# Patient Record
Sex: MALE | Race: BLACK OR AFRICAN AMERICAN | Employment: FULL TIME | URBAN - METROPOLITAN AREA
[De-identification: names, ages, dates, MRNs, and addresses within clinical notes are randomized per-mention and may not be internally consistent; named-entity substitution may affect disease eponyms.]

---

## 2023-02-22 ENCOUNTER — APPOINTMENT (OUTPATIENT)
Dept: CT IMAGING | Age: 48
End: 2023-02-22
Payer: COMMERCIAL

## 2023-02-22 ENCOUNTER — HOSPITAL ENCOUNTER (EMERGENCY)
Age: 48
Discharge: HOME | End: 2023-02-22
Payer: COMMERCIAL

## 2023-02-22 VITALS
RESPIRATION RATE: 20 BRPM | OXYGEN SATURATION: 100 % | DIASTOLIC BLOOD PRESSURE: 88 MMHG | HEART RATE: 78 BPM | SYSTOLIC BLOOD PRESSURE: 142 MMHG | TEMPERATURE: 98.2 F

## 2023-02-22 DIAGNOSIS — S09.90XA INJURY OF HEAD, INITIAL ENCOUNTER: Primary | ICD-10-CM

## 2023-02-22 DIAGNOSIS — J34.89 NASAL SORE: ICD-10-CM

## 2023-02-22 PROCEDURE — 99284 EMERGENCY DEPT VISIT MOD MDM: CPT

## 2023-02-22 PROCEDURE — 70450 CT HEAD/BRAIN W/O DYE: CPT

## 2023-02-22 RX ORDER — ECHINACEA PURPUREA EXTRACT 125 MG
1 TABLET ORAL PRN
Qty: 1 EACH | Refills: 3 | Status: SHIPPED | OUTPATIENT
Start: 2023-02-22

## 2023-02-22 NOTE — ED PROVIDER NOTES
EMERGENCY DEPARTMENT ENCOUNTER      PCP: No primary care provider on file. CHIEF COMPLAINT    Chief Complaint   Patient presents with    Head Injury     Injury jan 2010        HPI    Juan Rangel is a 41 y.o. male with no significant medical history who presents with complaints of pressure on the right side of his head and sore/pimple in his right nares with intermittent bleeding. He states he had a head injury in 2010 where a brick fell on his head. He states he had to have sutures for laceration at that time. Since then, he has had pressure to the area. He denies any pain. He states he would like to have this checked out. He is complaining of pain in his right nares that he rates as mild, tender, and intermittent. He states he occasionally feels a sore or pimple in his right nares. He states sometimes when he blows his nose he has blood from the nares. Nothing has alleviated or exacerbated his symptoms. He denies any fever, recent trauma or complaints at this time. REVIEW OF SYSTEMS    Constitutional:  Denies fever, chills, weight loss or weakness   HENT:  See HPI  Cardiovascular:  Denies chest pain, palpitations   Respiratory:  Denies cough or shortness of breath    GI:  Denies abdominal pain, nausea, vomiting, or diarrhea  :  Denies any urinary symptoms  Musculoskeletal:  Denies back pain  Skin:  Denies rash  Neurologic:  Denies headache, focal weakness. Does complain of   Endocrine:  Denies polyuria or polydypsia   Lymphatic:  Denies swollen glands     All other review of systems are negative  See HPI and nursing notes for additional information     PAST MEDICAL AND SURGICAL HISTORY    History reviewed. No pertinent past medical history.   Past Surgical History:   Procedure Laterality Date    HERNIA REPAIR         CURRENT MEDICATIONS        ALLERGIES    No Known Allergies    SOCIAL AND FAMILY HISTORY    Social History     Socioeconomic History    Marital status:      Spouse name: None    Number of children: None    Years of education: None    Highest education level: None   Tobacco Use    Smoking status: Never    Smokeless tobacco: Never   Substance and Sexual Activity    Alcohol use: Not Currently    Drug use: Never     History reviewed. No pertinent family history. PHYSICAL EXAM    VITAL SIGNS: BP (!) 142/88   Pulse 78   Temp 98.2 °F (36.8 °C) (Oral)   Resp 20   SpO2 100%    Constitutional:  Well developed, Well nourished. No distress  HENT:  Normocephalic, Atraumatic, PERRL. EOMI. Sclera clear. Conjunctiva normal, No discharge. No sores visualized in nares on exam. No bleeding. Neck/Lymphatics: supple, no JVD, no swollen nodes  Cardiovascular:   RRR,    No JVD  Respiratory:  Nonlabored breathing. Normal breath sounds, No wheezing  Abdomen: Bowel sounds normal, Soft, No tenderness, no masses. Musculoskeletal:    Distal cap refill and pulses intact bilateral upper and lower extremities  Bilateral upper and lower extremity ROM intact without pain or obvious deficit  Integument:  Warm, Dry  Neurologic: Alert & oriented , No focal deficits noted. Cranial nerves II through XII grossly intact. Normal gross motor coordination & motor strength bilateral upper and lower extremities  Sensation intact. Psychiatric:  Affect normal, Mood normal.       RADIOLOGY      Non-plain film images such as CT, Ultrasound and MRI are read by the radiologist. ANJELICA Ly CNP have directly visualized the radiologic plain film image(s) with the below findings read by radiologist and agree with interpretation:  CT HEAD WO CONTRAST   Final Result   No acute intracranial abnormality. CC/HPI Summary, DDx, ED Course, and Reassessment:   41 y.o. male with no significant medical history who presents with complaints of pressure on the right side of his head and sore/pimple in his right nares with intermittent bleeding. CT head without contrast obtained.     History from : Patient    Limitations to history : Language [facility] video  utilized for visit    Patient was given the following medications:  Medications - No data to display      Chronic conditions affecting care: head injury 2010    Discussion with Other Profesionals : None    Social Determinants : None available    Records Reviewed : None    Disposition Considerations (tests considered but not done, Shared Decision Making, Pt Expectation of Test or Tx.):   Appropriate for outpatient management CT of the head without contrast shows no acute findings. The patient's symptoms have been present since 2010, I did not feel any further work-up was necessary in the emergency department today. I did not visualize any sore in his nose at this time. He was prescribed saline nasal spray and instructed to use daily as needed. He was instructed to follow-up with a primary care provider this week regarding his head pressure and nasal sores. He was instructed to call today to schedule an appointment. He was instructed to return to the emergency department with worsening symptoms. He verbalized understanding and agrees to plan of care. I am the Primary Clinician of Record. Patient agrees to return emergency department if symptoms worsen or any new symptoms develop. Vital signs and nursing notes reviewed during ED course. I have low suspicion for intracranial hemorrhage, intracranial mass, nasal fracture, septal hematoma, hemorrhage, or other emergent condition. Clinical  IMPRESSION    1. Injury of head, initial encounter    2. Nasal sore              Comment: Please note this report has been produced using speech recognition software and may contain errors related to that system including errors in grammar, punctuation, and spelling, as well as words and phrases that may be inappropriate. If there are any questions or concerns please feel free to contact the dictating provider for clarification. Gabino Course, APRN - CNP  02/22/23 2273

## 2023-02-22 NOTE — DISCHARGE INSTRUCTIONS
Follow up primary care provider within 1 to 2 weeks. Call today to schedule an appointment. Have them recheck your blood pressure, it was elevated during today's stay. Use nasal saline spray as directed. Return to the emergency department with worsening symptoms.

## 2023-02-22 NOTE — ED TRIAGE NOTES
Pt presents to ED for a head injury that happened in 2010, states he feels like his head has been \"heavy\" since.

## 2023-02-22 NOTE — Clinical Note
Lynn Rojas was seen and treated in our emergency department on 2/22/2023. He may return to work on 02/23/2023. If you have any questions or concerns, please don't hesitate to call.       ANJELICA Cornejo - CNP

## 2023-12-08 ENCOUNTER — HOSPITAL ENCOUNTER (EMERGENCY)
Age: 48
Discharge: HOME | End: 2023-12-08
Attending: STUDENT IN AN ORGANIZED HEALTH CARE EDUCATION/TRAINING PROGRAM
Payer: COMMERCIAL

## 2023-12-08 VITALS
DIASTOLIC BLOOD PRESSURE: 87 MMHG | SYSTOLIC BLOOD PRESSURE: 118 MMHG | HEART RATE: 72 BPM | OXYGEN SATURATION: 99 % | WEIGHT: 182.4 LBS | RESPIRATION RATE: 19 BRPM | HEIGHT: 72 IN | TEMPERATURE: 97.9 F | BODY MASS INDEX: 24.71 KG/M2

## 2023-12-08 DIAGNOSIS — R51.9 NONINTRACTABLE HEADACHE, UNSPECIFIED CHRONICITY PATTERN, UNSPECIFIED HEADACHE TYPE: Primary | ICD-10-CM

## 2023-12-08 LAB
EKG ATRIAL RATE: 72 BPM
EKG DIAGNOSIS: NORMAL
EKG P AXIS: 69 DEGREES
EKG P-R INTERVAL: 168 MS
EKG Q-T INTERVAL: 366 MS
EKG QRS DURATION: 104 MS
EKG QTC CALCULATION (BAZETT): 400 MS
EKG R AXIS: -12 DEGREES
EKG T AXIS: 30 DEGREES
EKG VENTRICULAR RATE: 72 BPM

## 2023-12-08 PROCEDURE — 99283 EMERGENCY DEPT VISIT LOW MDM: CPT

## 2023-12-08 PROCEDURE — 93005 ELECTROCARDIOGRAM TRACING: CPT | Performed by: STUDENT IN AN ORGANIZED HEALTH CARE EDUCATION/TRAINING PROGRAM

## 2023-12-08 ASSESSMENT — ENCOUNTER SYMPTOMS
ABDOMINAL PAIN: 0
SHORTNESS OF BREATH: 0
SORE THROAT: 0
COUGH: 0
VOMITING: 0
NAUSEA: 0

## 2023-12-08 ASSESSMENT — PAIN SCALES - GENERAL: PAINLEVEL_OUTOF10: 3

## 2023-12-08 ASSESSMENT — PAIN DESCRIPTION - DESCRIPTORS: DESCRIPTORS: PRESSURE

## 2023-12-08 ASSESSMENT — PAIN DESCRIPTION - LOCATION: LOCATION: HEAD;NECK

## 2023-12-08 NOTE — ED PROVIDER NOTES
[address] [facility]  Emergency Department Encounter    Pt Name:Gian Arnold  MRN: [id number]  [address] 1975  Date of evaluation: 12/8/23  PCP:  No primary care provider on file. CHIEF COMPLAINT       Chief Complaint   Patient presents with    Hypertension     Patient reports for the past 2 weeks he has not been feeling well and his blood pressure has been elevated. HISTORY OF PRESENT ILLNESS     Edgard Liang is a 57 y.o. male who presents with concern for elevated blood pressure. Patient is [place] speaking only. Patient states that he has been checking his blood pressure and his numbers have been 13 x 9, 14 x 8 which through the  seems that these are at 130/90 and 140/80. He states his blood pressures are usually lower. He states this morning he woke up with not a \"headache \"but had heaviness. He states that he commonly gets this after not being able to sleep. He states that he has a job requiring him to wake up early in the morning and goes to bed late at such as he did last night. States this head heaviness is no different than previous head heaviness. Reports it is mild. No numbness tingling weakness vision changes. He denies any chest pain or shortness of breath. No cough nasal congestion, sore throat. Patient also states he wants to be evaluated for his kidneys. States when he was [facility] he had an ultrasound that showed kidney stones and he is wondering if they are still there. He states he has no problem with urination or flank pain. PAST MEDICAL / SURGICAL / SOCIAL / FAMILY HISTORY      has no past medical history on file. has a past surgical history that includes hernia repair.     Social History     Socioeconomic History    Marital status:      Spouse name: Not on file    Number of children: Not on file    Years of education: Not on file    Highest education level: Not on file distress. Appearance: He is well-developed. He is not ill-appearing. HENT:      Head: Normocephalic and atraumatic. Eyes:      Pupils: Pupils are equal, round, and reactive to light. Cardiovascular:      Rate and Rhythm: Normal rate and regular rhythm. Pulmonary:      Effort: Pulmonary effort is normal.      Breath sounds: Normal breath sounds. Abdominal:      General: There is no distension. Palpations: Abdomen is soft. Tenderness: There is no abdominal tenderness. Musculoskeletal:         General: Normal range of motion. Cervical back: Normal range of motion and neck supple. Skin:     General: Skin is warm and dry. Neurological:      Mental Status: He is alert and oriented to person, place, and time. Sensory: No sensory deficit. Motor: No weakness. ORDERS     PLAN (LABS / IMAGING / EKG):  No orders of the defined types were placed in this encounter. MEDICATIONS ORDERED:  No orders of the defined types were placed in this encounter. PROCEDURES     Procedures    DIAGNOSTIC RESULTS / EMERGENCY DEPARTMENT COURSE / MDM     LAB RESULTS:  No results found for this visit on 12/08/23. RADIOLOGY:  No orders to display        EKG  EKG Interpretation by Me: EKG interpreted by me shows normal sinus rhythm normal rate normal axis, LVH pattern. T waves unremarkable. Interpretation is LVH otherwise unremarkable ECG. All EKG's are interpreted by the Emergency Department Physician who either signs or Co-signs this chart in the absence of a cardiologist.      [address]:      Medical Decision Making  Patient here with main concerns for his elevated blood pressure and secondary concerns for head heaviness and wants checked for possible kidney stone.   Blood pressure here is normal.  Other vital signs normal.  It was repeated multiple times through my lengthy discussion with him in the room all of which have been normal.    He has 5 out of 5

## 2023-12-08 NOTE — ED NOTES
Pt to ED today stating he has not been feeling well for about a week, feeling \"tired, weak/sluggish:   Pt states his BP has also has been elevated at home.          Radha Patton RN  12/08/23 [id number]

## 2023-12-11 PROCEDURE — 93010 ELECTROCARDIOGRAM REPORT: CPT | Performed by: INTERNAL MEDICINE

## 2025-04-04 ENCOUNTER — TRANSCRIBE ORDERS (OUTPATIENT)
Dept: ADMINISTRATIVE | Age: 50
End: 2025-04-04

## 2025-04-04 DIAGNOSIS — Z87.442 HISTORY OF RENAL CALCULI: Primary | ICD-10-CM
